# Patient Record
Sex: MALE | Race: BLACK OR AFRICAN AMERICAN | NOT HISPANIC OR LATINO | Employment: FULL TIME | URBAN - METROPOLITAN AREA
[De-identification: names, ages, dates, MRNs, and addresses within clinical notes are randomized per-mention and may not be internally consistent; named-entity substitution may affect disease eponyms.]

---

## 2022-11-08 ENCOUNTER — HOSPITAL ENCOUNTER (EMERGENCY)
Facility: CLINIC | Age: 32
Discharge: HOME | End: 2022-11-08
Attending: PHYSICIAN ASSISTANT | Admitting: PHYSICIAN ASSISTANT

## 2022-11-08 VITALS
TEMPERATURE: 99.8 F | SYSTOLIC BLOOD PRESSURE: 157 MMHG | HEART RATE: 95 BPM | BODY MASS INDEX: 27.4 KG/M2 | RESPIRATION RATE: 15 BRPM | DIASTOLIC BLOOD PRESSURE: 95 MMHG | OXYGEN SATURATION: 100 % | HEIGHT: 69 IN | WEIGHT: 185 LBS

## 2022-11-08 DIAGNOSIS — S05.02XA ABRASION OF LEFT CORNEA, INITIAL ENCOUNTER: ICD-10-CM

## 2022-11-08 PROCEDURE — 99284 EMERGENCY DEPT VISIT MOD MDM: CPT

## 2022-11-08 PROCEDURE — 250N000009 HC RX 250: Performed by: EMERGENCY MEDICINE

## 2022-11-08 RX ORDER — PROPARACAINE HYDROCHLORIDE 5 MG/ML
1 SOLUTION/ DROPS OPHTHALMIC ONCE
Status: COMPLETED | OUTPATIENT
Start: 2022-11-08 | End: 2022-11-08

## 2022-11-08 RX ORDER — KETOROLAC TROMETHAMINE 5 MG/ML
1 SOLUTION OPHTHALMIC 4 TIMES DAILY PRN
Qty: 10 ML | Refills: 0 | Status: SHIPPED | OUTPATIENT
Start: 2022-11-08 | End: 2023-04-20

## 2022-11-08 RX ORDER — CIPROFLOXACIN HYDROCHLORIDE 3.5 MG/ML
1-2 SOLUTION/ DROPS TOPICAL EVERY 4 HOURS
Qty: 3 ML | Refills: 0 | Status: SHIPPED | OUTPATIENT
Start: 2022-11-08 | End: 2022-11-13

## 2022-11-08 RX ADMIN — PROPARACAINE HYDROCHLORIDE 1 DROP: 5 SOLUTION/ DROPS OPHTHALMIC at 14:50

## 2022-11-08 RX ADMIN — FLUORESCEIN SODIUM 600 MCG: 0.6 STRIP OPHTHALMIC at 14:51

## 2022-11-08 ASSESSMENT — ENCOUNTER SYMPTOMS
EYE PAIN: 1
EYE REDNESS: 1
PHOTOPHOBIA: 1
EYE DISCHARGE: 1
HEADACHES: 1

## 2022-11-08 ASSESSMENT — TONOMETRY: OS_IOP_MMHG: 13

## 2022-11-08 NOTE — Clinical Note
Sanjay Estrada was seen and treated in our emergency department on 11/8/2022.  He may return to work on 11/10/2022.       If you have any questions or concerns, please don't hesitate to call.      Angel Hair PA-C

## 2022-11-08 NOTE — ED PROVIDER NOTES
"  History     Chief Complaint:  Eye Pain       HPI   Sanjay Estrada is a 32 year old male  presents to the emergency department after his son poked him in the left eye 4 days ago.  Since then he has had persistent pain in his right eye with redness and tearing.  He reports with the due to the tearing he feels like his vision is blurry as it is hard to keep his eyes open.  He reports light sensitivity.  He reports he is also been having some headaches due to this.  No vomiting.  He does not wear contacts.  Patient reports however once he got the tetracaine drops from the triage nurse he has been able to keep his eye open better and his vision is better.    ROS:  Review of Systems   Eyes: Positive for photophobia, pain, discharge, redness and visual disturbance.   Gastrointestinal: Negative for nausea and vomiting.   Neurological: Positive for headaches.   All other systems reviewed and are negative.      Allergies:  No Known Allergies     Medications:    None    Past Medical History:    No pertinent medical hx    Past Surgical History:    No pertinent surgical hx    Social History:  Presents to the ED alone    Physical Exam   Patient Vitals for the past 24 hrs:   BP Temp Temp src Pulse Resp SpO2 Height Weight   11/08/22 1445 (!) 157/95 99.8  F (37.7  C) Temporal 95 15 100 % 1.753 m (5' 9\") 83.9 kg (185 lb)        Physical Exam  Vitals and nursing note reviewed.   Eyes:      General: Lids are normal. Lids are everted, no foreign bodies appreciated.         Left eye: Discharge (watery) present.No foreign body.      Intraocular pressure: Right eye pressure is 12 mmHg. Left eye pressure is 13 mmHg.      Extraocular Movements: Extraocular movements intact.      Right eye: Normal extraocular motion.      Left eye: Normal extraocular motion.      Conjunctiva/sclera:      Right eye: Right conjunctiva is not injected. No chemosis or exudate.     Left eye: Left conjunctiva is injected. No chemosis, exudate or " hemorrhage.     Pupils: Pupils are equal, round, and reactive to light. Pupils are equal.      Right eye: Pupil is round, reactive and not sluggish. No corneal abrasion.      Left eye: Pupil is reactive and not sluggish. Corneal abrasion present.        Comments: Corneal abrasion with consistent fluorescein uptake located in image above with woods lamp.    Visual acuity : Right 20/20, Left 20/25  No hyphemas.       Unable to perform slit lamp exam due to equipment malfunction     Emergency Department Course       Imaging:  No orders to display      Laboratory:  Labs Ordered and Resulted from Time of ED Arrival to Time of ED Departure - No data to display     Procedures     Emergency Department Course:          Reviewed:  I reviewed nursing notes, vitals and past medical history    Assessments/Consults:          Interventions:  Medications   proparacaine (ALCAINE) 0.5 % ophthalmic solution 1 drop (1 drop Left Eye Given 11/8/22 1450)   fluorescein (FUL-GEORGIA) ophthalmic strip STRP 600 mcg (600 mcg Left Eye Given 11/8/22 1451)        Disposition:  The patient was discharged to home.     Impression & Plan    CMS Diagnoses: None    Medical Decision Making:    This is a 32-year-old male who presents to the emergency department for cute onset of left thigh pain following being poked in the eye by his infant son by accident.  I considered multiple diagnoses include but not limited to corneal abrasion, corneal ulcer, uveitis, globe injury, acute glaucoma, and other eye emergencies.  After careful consideration of his present illness, physical exam findings, and vital signs my diagnosis impression below.  There is a corneal abrasion with fluorescein uptake clearly seen under black light with the Woods lamp.  Unfortunate was not able to perform slit-lamp exam due to malfunctioning equipment.  I think likely this is due to a corneal abrasion and this is less likely uveitis however given his myriad of symptoms I do recommend he  follow-up with ophthalmology tomorrow for recheck and evaluation.  Will provide with antibiotic drops and Toradol drops.  IOP's were normal.  Return back to the emergency department if his vision or pain worsen.    My impression of today's diagnosis is:     ICD-10-CM    1. Abrasion of left cornea, initial encounter  S05.02XA         Discharge Medications:  New Prescriptions    CIPROFLOXACIN (CILOXAN) 0.3 % OPHTHALMIC SOLUTION    Place 1-2 drops Into the left eye every 4 hours for 5 days    KETOROLAC (ACULAR) 0.5 % OPHTHALMIC SOLUTION    Place 1 drop Into the left eye 4 times daily as needed (pain)        11/8/2022   Angel Hair, Angel Cutler PA-C  11/09/22 1421

## 2022-11-08 NOTE — ED NOTES
Bed: ED32  Expected date:   Expected time:   Means of arrival:   Comments:  TRIAGE, NEEDS TO BE CLEANED

## 2022-11-08 NOTE — ED TRIAGE NOTES
Infant poked patient in eye Sunday. Since has been painful, red, tearing.     Triage Assessment     Row Name 11/08/22 6807       Triage Assessment (Adult)    Airway WDL WDL       Respiratory WDL    Respiratory WDL WDL       Skin Circulation/Temperature WDL    Skin Circulation/Temperature WDL WDL       Cardiac WDL    Cardiac WDL WDL       Peripheral/Neurovascular WDL    Peripheral Neurovascular WDL WDL       Cognitive/Neuro/Behavioral WDL    Cognitive/Neuro/Behavioral WDL WDL

## 2022-11-08 NOTE — Clinical Note
Sanjay Estrada was seen and treated in our emergency department on 11/8/2022.  He may return to work on 11/09/2022.       If you have any questions or concerns, please don't hesitate to call.      Angel Hair PA-C

## 2022-11-09 ENCOUNTER — TELEPHONE (OUTPATIENT)
Dept: OPHTHALMOLOGY | Facility: CLINIC | Age: 32
End: 2022-11-09

## 2022-11-09 ASSESSMENT — TONOMETRY: OD_IOP_MMHG: 12

## 2022-11-09 ASSESSMENT — ENCOUNTER SYMPTOMS
VOMITING: 0
NAUSEA: 0

## 2022-11-09 NOTE — TELEPHONE ENCOUNTER
Called x 2     Tried to provide a sooner appt     LVM for Sanjay to call back     Kait Olson Communication Facilitator on 11/9/2022 at 3:21 PM

## 2022-11-09 NOTE — TELEPHONE ENCOUNTER
[facility] Call Center    Phone Message    May a detailed message be left on voicemail: yes     Reason for Call: Other: Pt has an appt scheduled for 11/29 for a cornea abrasion but states the emergency room he went to last night wanted him to be seen within 1-2 days. Please reach out to pt and assess if sooner appt is needed and available for the abrasion.    Thank You!     Action Taken: Message routed to:  [facility] eye    Travel Screening: Not Applicable

## 2022-11-10 ENCOUNTER — TELEPHONE (OUTPATIENT)
Dept: OPHTHALMOLOGY | Facility: CLINIC | Age: 32
End: 2022-11-10

## 2022-11-10 NOTE — TELEPHONE ENCOUNTER
THIRD ATTEMPT TO REACH PT     LVM  For Sanjay- Tried to schedule him to a sooner time / date     Kait Olson Communication Facilitator on 11/10/2022 at 8:45 AM

## 2022-11-10 NOTE — TELEPHONE ENCOUNTER
[phone] home/cell    Left message with direct number to review scheduling options.    Ovidio Hernández RN 2:27 PM 11/10/22

## 2022-11-28 ENCOUNTER — TELEPHONE (OUTPATIENT)
Dept: OPHTHALMOLOGY | Facility: CLINIC | Age: 32
End: 2022-11-28

## 2023-01-26 ENCOUNTER — OFFICE VISIT (OUTPATIENT)
Dept: URGENT CARE | Facility: URGENT CARE | Age: 33
End: 2023-01-26
Payer: COMMERCIAL

## 2023-01-26 VITALS
BODY MASS INDEX: 28.94 KG/M2 | HEART RATE: 96 BPM | WEIGHT: 196 LBS | OXYGEN SATURATION: 98 % | RESPIRATION RATE: 16 BRPM | SYSTOLIC BLOOD PRESSURE: 147 MMHG | DIASTOLIC BLOOD PRESSURE: 108 MMHG | TEMPERATURE: 98.6 F

## 2023-01-26 DIAGNOSIS — R07.0 THROAT PAIN: Primary | ICD-10-CM

## 2023-01-26 DIAGNOSIS — R07.0 THROAT PAIN: ICD-10-CM

## 2023-01-26 DIAGNOSIS — J02.9 VIRAL PHARYNGITIS: ICD-10-CM

## 2023-01-26 LAB
DEPRECATED S PYO AG THROAT QL EIA: NEGATIVE
FLUAV AG SPEC QL IA: NEGATIVE
FLUBV AG SPEC QL IA: NEGATIVE
GROUP A STREP BY PCR: NOT DETECTED
SARS-COV-2 RNA RESP QL NAA+PROBE: NEGATIVE

## 2023-01-26 PROCEDURE — 99213 OFFICE O/P EST LOW 20 MIN: CPT | Mod: CS | Performed by: PHYSICIAN ASSISTANT

## 2023-01-26 PROCEDURE — U0003 INFECTIOUS AGENT DETECTION BY NUCLEIC ACID (DNA OR RNA); SEVERE ACUTE RESPIRATORY SYNDROME CORONAVIRUS 2 (SARS-COV-2) (CORONAVIRUS DISEASE [COVID-19]), AMPLIFIED PROBE TECHNIQUE, MAKING USE OF HIGH THROUGHPUT TECHNOLOGIES AS DESCRIBED BY CMS-2020-01-R: HCPCS | Performed by: PHYSICIAN ASSISTANT

## 2023-01-26 PROCEDURE — U0005 INFEC AGEN DETEC AMPLI PROBE: HCPCS | Performed by: PHYSICIAN ASSISTANT

## 2023-01-26 PROCEDURE — 87804 INFLUENZA ASSAY W/OPTIC: CPT | Performed by: PHYSICIAN ASSISTANT

## 2023-01-26 PROCEDURE — 87651 STREP A DNA AMP PROBE: CPT | Performed by: PHYSICIAN ASSISTANT

## 2023-01-26 RX ORDER — IBUPROFEN 800 MG/1
800 TABLET, FILM COATED ORAL EVERY 8 HOURS PRN
Qty: 30 TABLET | Refills: 0 | Status: SHIPPED | OUTPATIENT
Start: 2023-01-26 | End: 2023-04-20

## 2023-01-26 RX ORDER — DIPHENHYDRAMINE HYDROCHLORIDE AND LIDOCAINE HYDROCHLORIDE AND ALUMINUM HYDROXIDE AND MAGNESIUM HYDRO
KIT
Qty: 120 ML | Refills: 0 | Status: SHIPPED | OUTPATIENT
Start: 2023-01-26 | End: 2023-01-26

## 2023-01-26 NOTE — PROGRESS NOTES
"  Assessment & Plan     Throat pain    You or your child have a sore throat (pharyngitis). This infection is caused by a virus. It can cause throat pain that is worse when swallowing, aching all over, headache, and fever. The infection may be spread by coughing, kissing, or touching others after touching your mouth or nose. Antibiotic medicines don't work against viruses. They are not used for treating this illness.     Strep neg culture pending  Motrin for throat pain  Magic mouthwash prn throat pain    - Symptomatic COVID-19 Virus (Coronavirus) by PCR Nose  - Streptococcus A Rapid Screen w/Reflex to PCR - Clinic Collect  - Influenza A & B Antigen - Clinic Collect  - Group A Streptococcus PCR Throat Swab  - magic mouthwash (ENTER INGREDIENTS IN COMMENTS) suspension; Swish and spit 5-10 mLs in mouth every 6 hours as needed 30 ml of Benadryl (12.5 mg/5 ml), 60 ml Maalox, 30 ml Viscous Lidocaine  - ibuprofen (ADVIL/MOTRIN) 800 MG tablet; Take 1 tablet (800 mg) by mouth every 8 hours as needed    Viral pharyngitis    - magic mouthwash (ENTER INGREDIENTS IN COMMENTS) suspension; Swish and spit 5-10 mLs in mouth every 6 hours as needed 30 ml of Benadryl (12.5 mg/5 ml), 60 ml Maalox, 30 ml Viscous Lidocaine  - ibuprofen (ADVIL/MOTRIN) 800 MG tablet; Take 1 tablet (800 mg) by mouth every 8 hours as needed         BMI:   Estimated body mass index is 28.94 kg/m  as calculated from the following:    Height as of 11/8/22: 1.753 m (5' 9\").    Weight as of this encounter: 88.9 kg (196 lb).       At today's visit with Sanjay Estrada , we discussed results, diagnosis, medications and formulated a plan.  We also discussed red flags for immediate return to clinic/ER, as well as indications for follow up with PCP if not improved in 3 days. Patient understood and agreed to plan. Sanjay Estrada was discharged with stable vitals and has no further questions.       No follow-ups on file.    Fernando Henry, [facility], PA-C  [facility] ABBI Grace is a 32 year old, presenting for the following health issues:  Pharyngitis (Sore throat body aches and cough)      HPI     Review of Systems         Objective    BP (!) 147/108   Pulse 96   Temp 98.6  F (37  C) (Tympanic)   Resp 16   Wt 88.9 kg (196 lb)   SpO2 98%   BMI 28.94 kg/m    Body mass index is 28.94 kg/m .  Physical Exam   GENERAL: healthy, alert and no distress  EYES: Eyes grossly normal to inspection, PERRL and conjunctivae and sclerae normal  HENT: ear canals and TM's normal, nose and mouth without ulcers or lesions  NECK: no adenopathy, no asymmetry, masses, or scars and thyroid normal to palpation  RESP: lungs clear to auscultation - no rales, rhonchi or wheezes  CV: regular rate and rhythm, normal S1 S2, no S3 or S4, no murmur, click or rub, no peripheral edema and peripheral pulses strong  SKIN: no suspicious lesions or rashes  NEURO: Normal strength and tone, mentation intact and speech normal  PSYCH: mentation appears normal, affect normal/bright      Results for orders placed or performed in visit on 01/26/23   Streptococcus A Rapid Screen w/Reflex to PCR - Clinic Collect     Status: Normal    Specimen: Throat; Swab   Result Value Ref Range    Group A Strep antigen Negative Negative   Influenza A & B Antigen - Clinic Collect     Status: Normal    Specimen: Nose; Swab   Result Value Ref Range    Influenza A antigen Negative Negative    Influenza B antigen Negative Negative    Narrative    Test results must be correlated with clinical data. If necessary, results should be confirmed by a molecular assay or viral culture.

## 2023-01-26 NOTE — LETTER
[facility]  [facility]    [address] [address]  Phone: [phone]    January 26, 2023      RE: Sanjay Estrada  [address]   [facility] [address]       To whom it may concern:    Sanjay Estrada was seen in our clinic today. Please excuse patient from 01/26, 01/27 for being sick, he may return to work on 01/28if he feels better.     Sincerely,      Fernando Henry PA-C

## 2023-04-20 ENCOUNTER — OFFICE VISIT (OUTPATIENT)
Dept: INTERNAL MEDICINE | Facility: CLINIC | Age: 33
End: 2023-04-20
Payer: COMMERCIAL

## 2023-04-20 VITALS
TEMPERATURE: 98.2 F | OXYGEN SATURATION: 99 % | BODY MASS INDEX: 29.36 KG/M2 | HEART RATE: 94 BPM | DIASTOLIC BLOOD PRESSURE: 106 MMHG | WEIGHT: 198.2 LBS | SYSTOLIC BLOOD PRESSURE: 157 MMHG | HEIGHT: 69 IN

## 2023-04-20 DIAGNOSIS — I10 ESSENTIAL HYPERTENSION: ICD-10-CM

## 2023-04-20 DIAGNOSIS — M25.561 CHRONIC PAIN OF RIGHT KNEE: ICD-10-CM

## 2023-04-20 DIAGNOSIS — Z13.220 ENCOUNTER FOR SCREENING FOR LIPID DISORDER: ICD-10-CM

## 2023-04-20 DIAGNOSIS — G89.29 CHRONIC PAIN OF RIGHT KNEE: ICD-10-CM

## 2023-04-20 DIAGNOSIS — Z00.00 ROUTINE GENERAL MEDICAL EXAMINATION AT A HEALTH CARE FACILITY: Primary | ICD-10-CM

## 2023-04-20 LAB
ALT SERPL W P-5'-P-CCNC: 44 U/L (ref 10–50)
ANION GAP SERPL CALCULATED.3IONS-SCNC: 11 MMOL/L (ref 7–15)
BASOPHILS # BLD AUTO: 0 10E3/UL (ref 0–0.2)
BASOPHILS NFR BLD AUTO: 1 %
BUN SERPL-MCNC: 14.8 MG/DL (ref 6–20)
CALCIUM SERPL-MCNC: 10.4 MG/DL (ref 8.6–10)
CHLORIDE SERPL-SCNC: 101 MMOL/L (ref 98–107)
CHOLEST SERPL-MCNC: 255 MG/DL
CREAT SERPL-MCNC: 1.16 MG/DL (ref 0.67–1.17)
DEPRECATED HCO3 PLAS-SCNC: 23 MMOL/L (ref 22–29)
EOSINOPHIL # BLD AUTO: 0.1 10E3/UL (ref 0–0.7)
EOSINOPHIL NFR BLD AUTO: 2 %
ERYTHROCYTE [DISTWIDTH] IN BLOOD BY AUTOMATED COUNT: 12.7 % (ref 10–15)
GFR SERPL CREATININE-BSD FRML MDRD: 86 ML/MIN/1.73M2
GLUCOSE SERPL-MCNC: 92 MG/DL (ref 70–99)
HCT VFR BLD AUTO: 50.5 % (ref 40–53)
HDLC SERPL-MCNC: 49 MG/DL
HGB BLD-MCNC: 17.3 G/DL (ref 13.3–17.7)
LDLC SERPL CALC-MCNC: 184 MG/DL
LYMPHOCYTES # BLD AUTO: 1.6 10E3/UL (ref 0.8–5.3)
LYMPHOCYTES NFR BLD AUTO: 42 %
MCH RBC QN AUTO: 30.2 PG (ref 26.5–33)
MCHC RBC AUTO-ENTMCNC: 34.3 G/DL (ref 31.5–36.5)
MCV RBC AUTO: 88 FL (ref 78–100)
MONOCYTES # BLD AUTO: 0.5 10E3/UL (ref 0–1.3)
MONOCYTES NFR BLD AUTO: 12 %
NEUTROPHILS # BLD AUTO: 1.7 10E3/UL (ref 1.6–8.3)
NEUTROPHILS NFR BLD AUTO: 44 %
NONHDLC SERPL-MCNC: 206 MG/DL
PLATELET # BLD AUTO: 261 10E3/UL (ref 150–450)
POTASSIUM SERPL-SCNC: 4.6 MMOL/L (ref 3.4–5.3)
RBC # BLD AUTO: 5.73 10E6/UL (ref 4.4–5.9)
SODIUM SERPL-SCNC: 135 MMOL/L (ref 136–145)
TRIGL SERPL-MCNC: 111 MG/DL
WBC # BLD AUTO: 3.9 10E3/UL (ref 4–11)

## 2023-04-20 PROCEDURE — 80048 BASIC METABOLIC PNL TOTAL CA: CPT | Performed by: INTERNAL MEDICINE

## 2023-04-20 PROCEDURE — 84460 ALANINE AMINO (ALT) (SGPT): CPT | Performed by: INTERNAL MEDICINE

## 2023-04-20 PROCEDURE — 36415 COLL VENOUS BLD VENIPUNCTURE: CPT | Performed by: INTERNAL MEDICINE

## 2023-04-20 PROCEDURE — 80061 LIPID PANEL: CPT | Performed by: INTERNAL MEDICINE

## 2023-04-20 PROCEDURE — 99214 OFFICE O/P EST MOD 30 MIN: CPT | Mod: 25 | Performed by: INTERNAL MEDICINE

## 2023-04-20 PROCEDURE — 85025 COMPLETE CBC W/AUTO DIFF WBC: CPT | Performed by: INTERNAL MEDICINE

## 2023-04-20 PROCEDURE — 99395 PREV VISIT EST AGE 18-39: CPT | Performed by: INTERNAL MEDICINE

## 2023-04-20 RX ORDER — AMLODIPINE BESYLATE 5 MG/1
5 TABLET ORAL DAILY
Qty: 90 TABLET | Refills: 4 | Status: SHIPPED | OUTPATIENT
Start: 2023-04-20

## 2023-04-20 ASSESSMENT — ENCOUNTER SYMPTOMS
HEMATURIA: 0
MYALGIAS: 0
PALPITATIONS: 0
DYSURIA: 0
HEADACHES: 0
JOINT SWELLING: 0
FREQUENCY: 0
CHILLS: 0
ABDOMINAL PAIN: 0
HEARTBURN: 0
SORE THROAT: 0
SHORTNESS OF BREATH: 0
WEAKNESS: 0
CONSTIPATION: 0
DIZZINESS: 0
PARESTHESIAS: 0
ARTHRALGIAS: 0
EYE PAIN: 0
NAUSEA: 0
NERVOUS/ANXIOUS: 0
DIARRHEA: 0
COUGH: 0
HEMATOCHEZIA: 0
FEVER: 0

## 2023-04-20 NOTE — PATIENT INSTRUCTIONS
- Our team will contact you via [employer] (if you sign up for it), telephone call (if results are urgent), or otherwise via letter in the mail with the results of today's lab tests once I have a chance to review them    Today we discussed your hypertension (high blood pressure). Four important things to remember about your hypertension:    1) Take all medications as prescribed! Today we discussed your blood pressure medications and decided to start a new medication called amlodipine. One benefit of this medication is that it does not require yearly blood draws. About 5-10% of patients notice they get leg swelling with this medication. This of course means that 90-95% of patients do not. The leg swelling is not dangerous to your health but can be bothersome. If you are part of the unlucky 5-10%, please let me know and we will switch you to a different blood pressure medication.    2) Lose weight! Losing weight is the best thing you can do to lower your blood pressure. Studies have found that for every 2 pounds you lose, your blood pressure will go down by 1 point. Ex: if you lose 10 pounds, your blood pressure should go down by 5 points. That's better than any medication, plus it will impact your health in a number of other positive ways. This may be a good time to make a weight loss goal.    3) Lower your sodium intake! Eating too much salt causes your body to hold onto extra water, which makes your blood pressure higher. Ditching the salt shaker is a good thing, but most of our sodium intake actually comes from pre-packaged foods. Read labels on cans and food packages. The labels tell you how much sodium is in each serving. Make sure that you look at the serving size. If you eat more than the serving size, you have eaten more sodium. Decreasing your sodium intake by more than 1,000mg per day can lower your blood pressure by up to 5 points and can be as effective as starting a blood pressure medication.    4) Check  your blood pressure at home! You can buy a home monitor in a drugstore, supermarket pharmacy, or other large store. Not all automated blood pressure machines are created equal. You can find a list of validated blood pressure cuffs (meaning they have been confirmed to give accurate readings) by going to www.validatebp.org. That website does not sell blood pressure cuffs, but rather it lists the exact models that have been validated to be accurate. Wrist blood pressure cuffs do not provide reliable comparisons to upper arm (brachial) cuffs. Be sure the arm cuff is the right size for your arm. Ask someone to measure around your upper arm. If your upper arm is more than 13 inches around, buy a monitor with a large cuff. To get a correct measurement, the cuff needs to be the right size.    It is important to measure your blood pressure periodically at home in between office visits. The readings you obtain during these blood pressure checks are often more valuable than the readings we obtain in clinic. However, it is even more important to check your blood pressure CORRECTLY at home. Follow these tips.    Check your blood pressure in the early morning (before you eat, drink, or take any medicines) and at one other random time of day. The random time of day does not need to be consistent from day to day.  Put the cuff on your arm. Remove clothes that get in the way of the cuff. Don t roll up your sleeve in a way that s tight around your arm. The cord should go toward your hand. Line it up with the middle of your forearm. The Velcro should attach easily on the cuff. If it doesn t reach, you may need a bigger cuff.  Wait 30 minutes if you have just eaten a lot, had a drink with caffeine or alcohol, used tobacco products, or exercised. Use the restroom if you need to. (Needing to go can raise your BP.)  Rest both feet flat on the floor with your back supported. Rest your arm at heart level on a table or the arm of a  chair.  Sit quietly for 5 minutes or more before taking your blood pressure. Avoid talking while your blood pressure is being measured.  Start the monitor. Press the button or squeeze the ball to measure your blood pressure. Write down the time, the measurement, and your pulse.  Wait 2 minutes.  Repeat 2 more times.  Take the average of the readings. That's your blood pressure.    Lastly, bring your home monitor into the office for us to validate! Compare your blood pressure monitor to the standardized method we use. It's a good idea to do this once per machine or if your machine starts giving you odd readings (suddenly much higher or lower than you're used to).

## 2023-04-20 NOTE — LETTER
[facility]  [address] [address]  Phone: [phone]   4/21/2023      Sanjay Estrada  [address]  [place] [address]        Dear Mr. Sanjay Estrada:    I am writing to inform you of the results of the laboratory tests you had done recently. Your comprehensive metabolic panel (which looks at your liver, kidneys, electrolytes, and glucose) is largely normal except your calcium is a bit high though not to a dangerous level. We can see this as a transient change in some patients and we ought to repeat that test at your next visit to ensure it normalizes. Your cholesterol panel is elevated. Guidelines recommend starting cholesterol-lowering medications on all patients who have a LDL (bad) cholesterol above 190. Yours is 184. Given your family history and how close you are to that threshold, it would be reasonable to treat you with a statin medication to lower your cholesterol and thus your risk of heart attack/stroke. If you tolerate the amlodipine blood pressure medication well, we could switch you to a 'combo pill' of amlodipine-atorvastatin, so you would get 2 medications in one pill (thus keeping your daily pill amount the same). Let me know if you're interested in that!    If you have any further questions or problems, please contact our nurse line at [phone]. An even easier way to get ahold of our team is through MyChart, which you can sign up for at [email or web address]. MyChart is not only a great way to communicate with us, but also allows you to see your full results.        Sincerely,        Noel Branch MD, MPH  Department of Internal Medicine  [facility]

## 2023-04-20 NOTE — PROGRESS NOTES
SUBJECTIVE:   CC: Sanjay is an 33 year old who presents for preventative health visit.   Patient has been advised of split billing requirements and indicates understanding: Yes  Healthy Habits:     Getting at least 3 servings of Calcium per day:  NO    Bi-annual eye exam:  NO    Dental care twice a year:  NO    Sleep apnea or symptoms of sleep apnea:  None    Diet:  Breakfast skipped and Other    Frequency of exercise:  2-3 days/week    Duration of exercise:  15-30 minutes    Taking medications regularly:  Not Applicable    Medication side effects:  Other    PHQ-2 Total Score: 0    Additional concerns today:  No    Sanjay presents today for a physical exam. This is the first time I have met Sanjay. We also discussed:    Additional concerns: Occasional chest pain when stressed (last happened 3 months ago), family history with high BP and heart problems. Chronic R knee pain.    Today's PHQ-2 Score:       4/20/2023     2:11 PM   PHQ-2 ( 1999 [employer])   Q1: Little interest or pleasure in doing things 0   Q2: Feeling down, depressed or hopeless 0   PHQ-2 Score 0   Q1: Little interest or pleasure in doing things Not at all   Q2: Feeling down, depressed or hopeless Not at all   PHQ-2 Score 0     Have you ever done Advance Care Planning? (For example, a Health Directive, POLST, or a discussion with a medical provider or your loved ones about your wishes): No, advance care planning information given to patient to review.  Patient plans to discuss their wishes with loved ones or provider.      Social History     Tobacco Use     Smoking status: Never     Smokeless tobacco: Never   Vaping Use     Vaping status: Never Used   Substance Use Topics     Alcohol use: Not on file         4/20/2023     2:10 PM   Alcohol Use   Prescreen: >3 drinks/day or >7 drinks/week? No     Reviewed orders with patient. Reviewed health maintenance and updated orders accordingly - Yes    Labs reviewed in EPIC    Reviewed and updated as needed this visit  "by clinical staff   Tobacco  Allergies  Meds  Problems  Med Hx  Surg Hx  Fam Hx        Reviewed and updated as needed this visit by Provider   Tobacco  Allergies  Meds  Problems  Med Hx  Surg Hx  Fam Hx         Review of Systems   Constitutional: Negative for chills and fever.   HENT: Negative for congestion, ear pain, hearing loss and sore throat.    Eyes: Negative for pain and visual disturbance.   Respiratory: Negative for cough and shortness of breath.    Cardiovascular: Negative for chest pain, palpitations and peripheral edema.   Gastrointestinal: Negative for abdominal pain, constipation, diarrhea, heartburn, hematochezia and nausea.   Genitourinary: Negative for dysuria, frequency, genital sores, hematuria, impotence, penile discharge and urgency.   Musculoskeletal: Negative for arthralgias, joint swelling and myalgias.   Skin: Negative for rash.   Neurological: Negative for dizziness, weakness, headaches and paresthesias.   Psychiatric/Behavioral: Negative for mood changes. The patient is not nervous/anxious.      OBJECTIVE:   BP (!) 157/106   Pulse 94   Temp 98.2  F (36.8  C) (Temporal)   Ht 1.753 m (5' 9\")   Wt 89.9 kg (198 lb 3.2 oz)   SpO2 99%   BMI 29.27 kg/m      Physical Exam  GENERAL: In no distress.  EYES: Conjunctivae/corneas clear. EOMs grossly intact.  HENT: NC/AT, facies symmetric. Neck supple. No LAD or thyromegaly noted.  RESP: CTAB. No w/r/r.  CV: RRR, no m/r/g.  GI: NT, ND, without rebound or guarding, no CVA tenderness, no hepatomegaly appreciated.  MSK: Moves all four extremities freely. R knee: No joint swelling noted. No overlying erythema noted. Joint line not TTP. Anterior and posterior drawers negative. Varus/valgus negative.  SKIN: No significant ulcers, lesions or rashes on the visualized portions of the skin  NEURO: Alert. Oriented.  PSYCH: Linear thought process. Speech normal rate and volume. No tangential thoughts, hallucinations, or delusions.    Diagnostic " "Test Results: Labs reviewed in Epic    ASSESSMENT/PLAN:   Routine general medical examination at a health care facility  Reviewed PMH. Discussed healthcare maintenance issues.    Essential hypertension  BP consistently >140/90. Discussed new guidelines that aim for <130/80. Patient open to starting medication today. Discussed lifestyle interventions such as weight loss and reducing sodium intake. Will initiate amlodipine. Counseled on possible side effects such as leg swelling and constipation. Encouraged to continue checking BP at home and to let me know via MyChart or f/u visit what those readings are in case we need to further titrate medication to obtain optimal BP control.  - amLODIPine (NORVASC) 5 MG tablet; Take 1 tablet (5 mg) by mouth daily  - Basic metabolic panel; Future  - ALT; Future  - CBC with Platelets & Differential; Future    Chronic pain of right knee  Concern for possible meniscal injury. Recommended orthopedic consultation and he was in agreement. Referral placed.  - Orthopedic  Referral; Future    Encounter for screening for lipid disorder  - Lipid panel reflex to direct LDL Non-fasting; Future    COUNSELING:   Reviewed preventive health counseling, as reflected in patient instructions    BMI:   Estimated body mass index is 29.27 kg/m  as calculated from the following:    Height as of this encounter: 1.753 m (5' 9\").    Weight as of this encounter: 89.9 kg (198 lb 3.2 oz).     He reports that he has never smoked. He has never used smokeless tobacco.    Noel Branch MD  [facility]  "

## 2023-08-04 ENCOUNTER — NURSE TRIAGE (OUTPATIENT)
Dept: NURSING | Facility: CLINIC | Age: 33
End: 2023-08-04
Payer: COMMERCIAL

## 2023-08-04 NOTE — TELEPHONE ENCOUNTER
Nurse Triage SBAR    Situation:   -eye pain     Background:   -Patient calling, It is okay to leave a detailed message at this number.     Assessment:   -had corneal abrasion and it keeps flairing up  -can't open his eyes (pain and photosensitivity)  -eyes watering  -nose running   -pain is 8/10  -eye is red  -eye lid is swollen    Recommendation:   Go to ED Now   -Patient declined this disposition  -he is requesting Dr. Michaud be made aware and to request antibiotics  -RN informed patient that he would need to be seen before prescribing given the situation, and that if RN route message to care team they will not see it until Monday (and will most likely tell him he needs to be evaluated)  -we discussed risk of delaying care (including blindness)  -Warm transferred to central scheduling to make an appointment  -routing on request of patient    Care team: please call patent and follow up at [phone] , will you prescribed antibiotics for a eye concern that has not been evaluated in person scene 11/8/22 in the ED?    NATHAN SWANN RN on 8/4/2023 at 5:48 PM     Reason for Disposition   Severe eye pain    Additional Information   Negative: Followed an eye injury   Negative: Eye pain from chemical in the eye   Negative: Eye pain from foreign body in eye   Negative: Has sinus pain or pressure    Protocols used: Eye Pain-A-[facility]     Jamey Crowley(Attending)

## 2023-08-07 NOTE — TELEPHONE ENCOUNTER
Attempted to contact pt to relay providers message from below. No answer. Left VM to call us back.     On callback- please relay providers message from below.      Patient Contact    Attempt # 1    Was call answered?  No.  Left message on voicemail with information to call me back.

## 2023-08-07 NOTE — TELEPHONE ENCOUNTER
I don't manage corneal abrasions. Patient needs to see an eye doctor ASAP. If he cannot see one ASAP, then he ought to present to ER.

## 2023-08-09 NOTE — TELEPHONE ENCOUNTER
Patient Contact    Attempt # 3    Was call answered?  No.  Left message on voicemail with information to call me back.    On call back, please relay provider recommendations to patient.     Third attempt to contact patient. No [employer] account on file. Routing to provider as FYI.